# Patient Record
Sex: MALE | Race: OTHER
[De-identification: names, ages, dates, MRNs, and addresses within clinical notes are randomized per-mention and may not be internally consistent; named-entity substitution may affect disease eponyms.]

---

## 2020-04-27 ENCOUNTER — HOSPITAL ENCOUNTER (OUTPATIENT)
Dept: HOSPITAL 72 - PAN | Age: 50
Discharge: HOME | End: 2020-04-27
Payer: MEDICAID

## 2020-04-27 VITALS — HEIGHT: 66 IN | WEIGHT: 160 LBS | BODY MASS INDEX: 25.71 KG/M2

## 2020-04-27 DIAGNOSIS — Z90.79: ICD-10-CM

## 2020-04-27 DIAGNOSIS — K21.9: Primary | ICD-10-CM

## 2020-04-27 DIAGNOSIS — Z85.46: ICD-10-CM

## 2020-04-27 NOTE — CONSULTATION
DATE OF CONSULTATION:  04/27/2020

CHIEF COMPLAINT:  GERD.



HISTORY OF PRESENT ILLNESS:  This is a very pleasant 49-year-old male who

presented to the hospital complaining of epigastric abdominal pain,

heartburn symptoms.  Apparently he was given omeprazole by prescription by

primary care physician without significant improvement.  Also complained

of some bloating, abdominal pain.  Denies any significant weight loss.  No

nausea.  No vomiting.  No dysphagia.  No odynophagia.



PAST MEDICAL HISTORY:

1. Prostate cancer.

2. ______



PAST SURGICAL HISTORY:  Prostate cancer surgery and testicular removal.



MEDICATIONS:  Omeprazole.



FAMILY HISTORY:  Father had brain cancer and mom had breast cancer.



SOCIAL HISTORY:  The patient drinks occasionally, but denies any tobacco

usage.



ALLERGIES:  No known drug allergies.



REVIEW OF SYSTEMS:  A 10-point review of systems was performed and positive

for GERD and bloating.  Past colonoscopy in 2019, also the patient says he

had abdominal ultrasound which has been negative.



PHYSICAL EXAMINATION:

VITAL SIGNS:  Height is 5 feet 6 inches, weight is 160.

HEENT:  Normocephalic and atraumatic.  Sclerae anicteric.

NECK:  Supple.  No evidence of obvious lymphadenopathy.

CARDIOVASCULAR:  Regular rate and rhythm. Plus S1 and S2.

LUNGS:  Clear to auscultation bilaterally.

ABDOMEN:  Positive bowel sounds.  Soft and nontender.  No rebound.  No

guarding.  No peritoneal sign.

EXTREMITIES:  No cyanosis.  No clubbing.  No edema.



ASSESSMENT AND PLAN:  This is a 49-year-old male with chronic GERD, not

responding to PPI, needs an endoscopy.  The patient was given instruction

for the EGD.  Risks and benefits of procedure was explained to him.  We

are going to try to get authorization and scheduling for endoscopy.

Meanwhile, the patient was given Protonix 40 mg twice a day.









  ______________________________________________

  Robert Bañuelos M.D.





DR:  Esther

D:  04/27/2020 12:59

T:  04/27/2020 21:38

JOB#:  [id number]

CC:

## 2020-05-28 ENCOUNTER — HOSPITAL ENCOUNTER (OUTPATIENT)
Dept: HOSPITAL 72 - PAN | Age: 50
Discharge: HOME | End: 2020-05-28
Payer: MEDICAID

## 2020-05-28 VITALS — SYSTOLIC BLOOD PRESSURE: 118 MMHG | DIASTOLIC BLOOD PRESSURE: 75 MMHG

## 2020-05-28 DIAGNOSIS — K21.9: Primary | ICD-10-CM

## 2020-05-28 PROCEDURE — 99212 OFFICE O/P EST SF 10 MIN: CPT

## 2020-05-28 NOTE — GENERAL PROGRESS NOTE
Assessment/Plan


Assessment/Plan:


GERD


s/p EGD


on ppi BID


on probiotics


add baclofen Qhs





Subjective


ROS Limited/Unobtainable:  Yes


Allergies:  


Coded Allergies:  


     No Known Allergies (Unverified , 4/27/20)





Objective


General Appearance:  alert


EENT:  normal ENT inspection


Neck:  supple


Cardiovascular:  normal rate


Respiratory/Chest:  lungs clear


Abdomen:  normal bowel sounds, non tender, soft


Extremities:  non-tender











Robert Bañuelos MD May 28, 2020 13:15